# Patient Record
Sex: MALE | URBAN - METROPOLITAN AREA
[De-identification: names, ages, dates, MRNs, and addresses within clinical notes are randomized per-mention and may not be internally consistent; named-entity substitution may affect disease eponyms.]

---

## 2020-11-10 ENCOUNTER — APPOINTMENT (OUTPATIENT)
Dept: URBAN - METROPOLITAN AREA CLINIC 237 | Age: 30
Setting detail: DERMATOLOGY
End: 2020-11-10

## 2020-11-10 ENCOUNTER — RX ONLY (RX ONLY)
Age: 30
End: 2020-11-10

## 2020-11-10 VITALS — TEMPERATURE: 96.9 F

## 2020-11-10 DIAGNOSIS — D22 MELANOCYTIC NEVI: ICD-10-CM

## 2020-11-10 DIAGNOSIS — L21.8 OTHER SEBORRHEIC DERMATITIS: ICD-10-CM

## 2020-11-10 DIAGNOSIS — L72.2 STEATOCYSTOMA MULTIPLEX: ICD-10-CM

## 2020-11-10 DIAGNOSIS — L72.0 EPIDERMAL CYST: ICD-10-CM

## 2020-11-10 PROBLEM — D22.5 MELANOCYTIC NEVI OF TRUNK: Status: ACTIVE | Noted: 2020-11-10

## 2020-11-10 PROCEDURE — OTHER ADDITIONAL NOTES: OTHER

## 2020-11-10 PROCEDURE — OTHER MIPS QUALITY: OTHER

## 2020-11-10 PROCEDURE — OTHER PRESCRIPTION: OTHER

## 2020-11-10 PROCEDURE — OTHER COUNSELING: OTHER

## 2020-11-10 PROCEDURE — OTHER MEDICATION COUNSELING: OTHER

## 2020-11-10 PROCEDURE — OTHER TREATMENT REGIMEN: OTHER

## 2020-11-10 PROCEDURE — 99202 OFFICE O/P NEW SF 15 MIN: CPT

## 2020-11-10 PROCEDURE — OTHER DIAGNOSIS COMMENT: OTHER

## 2020-11-10 RX ORDER — TRETIONIN 0.25 MG/G
CREAM TOPICAL
Qty: 1 | Refills: 0 | Status: CANCELLED

## 2020-11-10 RX ORDER — TRETIONIN 0.25 MG/G
CREAM TOPICAL
Qty: 1 | Refills: 0 | Status: ERX | COMMUNITY
Start: 2020-11-10

## 2020-11-10 RX ORDER — FLUOCINONIDE 0.5 MG/ML
SOLUTION TOPICAL
Qty: 1 | Refills: 1 | Status: ERX | COMMUNITY
Start: 2020-11-10

## 2020-11-10 RX ORDER — KETOCONAZOLE 20 MG/ML
SHAMPOO, SUSPENSION TOPICAL
Qty: 1 | Refills: 1 | Status: ERX | COMMUNITY
Start: 2020-11-10

## 2020-11-10 ASSESSMENT — LOCATION DETAILED DESCRIPTION DERM
LOCATION DETAILED: LEFT SUPERIOR PARIETAL SCALP
LOCATION DETAILED: LEFT INFERIOR TEMPLE
LOCATION DETAILED: LEFT LATERAL INFERIOR EYELID
LOCATION DETAILED: LEFT MEDIAL UPPER BACK
LOCATION DETAILED: STERNUM

## 2020-11-10 ASSESSMENT — LOCATION SIMPLE DESCRIPTION DERM
LOCATION SIMPLE: SCALP
LOCATION SIMPLE: CHEST
LOCATION SIMPLE: LEFT TEMPLE
LOCATION SIMPLE: LEFT UPPER BACK
LOCATION SIMPLE: LEFT INFERIOR EYELID

## 2020-11-10 ASSESSMENT — LOCATION ZONE DERM
LOCATION ZONE: FACE
LOCATION ZONE: TRUNK
LOCATION ZONE: SCALP
LOCATION ZONE: EYELID

## 2020-11-10 NOTE — PROCEDURE: TREATMENT REGIMEN
Detail Level: Zone
Initiate Treatment: Ketoconazole 2% shampoo 3x weekly; fluocinonide 0.05% topical solution PRN
Initiate Treatment: Tretinoin 0.025% cream QHS as tolerated (start 1-2x weekly)
Initiate Treatment: Tretinoin 0.025% topical cream. Apply pea size amount to chest and face a few nights a week, working up to QHS as tolerated.

## 2020-11-10 NOTE — PROCEDURE: MEDICATION COUNSELING
Xellavernez Pregnancy And Lactation Text: This medication is Pregnancy Category D and is not considered safe during pregnancy.  The risk during breast feeding is also uncertain.

## 2020-11-10 NOTE — PROCEDURE: MEDICATION COUNSELING
Closed displaced fracture of L lateral malleolus  S/p mechanical fall at home  - Discharged from [facility] 9/7 after admission for CVA (9/5-9/7). During that admission, recommendations were for patient to go to inpatient rehab; family opted for patient to return home with [facility] and -[facility]  - Mechanical fall while attempting to go to bathroom without assistance after arriving home  - Imaging revealed displaced fracture of L lateral malleolus with anterior and medial dislocation of the tibia  - Ortho surgery reduced and splinted in the ER; rec [facility]B LLE and initial plan for outpatient surgery  - Cautious use of opioids in elderly, analgesics PRN  - Family in agreement with patient going to Rehab for continued therapy. Family would like [facility]  - PT/OT/SLP consulted  - Per ortho surg, plan for OR 9/11. NPO at [facility]. Held lovenox.  9/11 Open treatment of right trimalleolar ankle fracture with internal fixation   of lateral malleolus without fixation of posterior lip, Open treatment of left ankle syndesmosis injury with internal fixation  - Anticipate discharge to [facility] 9/12 per rehab physician    Pt is intermediate risk for a low risk surgery.  Would recommend continuing ASA and Coreg through surgery. Would also recommend regional anesthesia > general anesthesia if possible.    Sarecycline Counseling: Patient advised regarding possible photosensitivity and discoloration of the teeth, skin, lips, tongue and gums.  Patient instructed to avoid sunlight, if possible.  When exposed to sunlight, patients should wear protective clothing, sunglasses, and sunscreen.  The patient was instructed to call the office immediately if the following severe adverse effects occur:  hearing changes, easy bruising/bleeding, severe headache, or vision changes.  The patient verbalized understanding of the proper use and possible adverse effects of sarecycline.  All of the patient's questions and concerns were addressed.

## 2021-04-25 NOTE — PROCEDURE: MEDICATION COUNSELING

## 2024-08-15 NOTE — PROCEDURE: MEDICATION COUNSELING
Stable, continue current regimen   Azithromycin Counseling:  I discussed with the patient the risks of azithromycin including but not limited to GI upset, allergic reaction, drug rash, diarrhea, and yeast infections.